# Patient Record
Sex: MALE | Race: BLACK OR AFRICAN AMERICAN | NOT HISPANIC OR LATINO | Employment: UNEMPLOYED | URBAN - METROPOLITAN AREA
[De-identification: names, ages, dates, MRNs, and addresses within clinical notes are randomized per-mention and may not be internally consistent; named-entity substitution may affect disease eponyms.]

---

## 2021-05-17 ENCOUNTER — HOSPITAL ENCOUNTER (EMERGENCY)
Facility: HOSPITAL | Age: 31
Discharge: HOME | End: 2021-05-17
Attending: EMERGENCY MEDICINE
Payer: COMMERCIAL

## 2021-05-17 VITALS
HEART RATE: 97 BPM | DIASTOLIC BLOOD PRESSURE: 68 MMHG | RESPIRATION RATE: 15 BRPM | OXYGEN SATURATION: 98 % | SYSTOLIC BLOOD PRESSURE: 145 MMHG | WEIGHT: 227.07 LBS | TEMPERATURE: 97.6 F | HEIGHT: 71 IN | BODY MASS INDEX: 31.79 KG/M2

## 2021-05-17 DIAGNOSIS — A64 STD (MALE): Primary | ICD-10-CM

## 2021-05-17 PROCEDURE — 87491 CHLMYD TRACH DNA AMP PROBE: CPT | Performed by: EMERGENCY MEDICINE

## 2021-05-17 PROCEDURE — 87591 N.GONORRHOEAE DNA AMP PROB: CPT | Performed by: EMERGENCY MEDICINE

## 2021-05-17 PROCEDURE — 36415 COLL VENOUS BLD VENIPUNCTURE: CPT | Performed by: EMERGENCY MEDICINE

## 2021-05-17 PROCEDURE — 99283 EMERGENCY DEPT VISIT LOW MDM: CPT

## 2021-05-17 PROCEDURE — 99284 EMERGENCY DEPT VISIT MOD MDM: CPT | Performed by: EMERGENCY MEDICINE

## 2021-05-17 PROCEDURE — 86592 SYPHILIS TEST NON-TREP QUAL: CPT | Performed by: EMERGENCY MEDICINE

## 2021-05-17 PROCEDURE — 96372 THER/PROPH/DIAG INJ SC/IM: CPT

## 2021-05-17 PROCEDURE — 87255 GENET VIRUS ISOLATE HSV: CPT | Performed by: EMERGENCY MEDICINE

## 2021-05-17 RX ORDER — DOXYCYCLINE HYCLATE 100 MG/1
100 CAPSULE ORAL 2 TIMES DAILY
Qty: 20 CAPSULE | Refills: 0 | Status: SHIPPED | OUTPATIENT
Start: 2021-05-17 | End: 2021-05-24

## 2021-05-17 RX ORDER — CEFTRIAXONE SODIUM 250 MG/1
250 INJECTION, POWDER, FOR SOLUTION INTRAMUSCULAR; INTRAVENOUS ONCE
Status: DISCONTINUED | OUTPATIENT
Start: 2021-05-17 | End: 2021-05-17

## 2021-05-17 RX ADMIN — WATER 500 MG: 1 INJECTION INTRAMUSCULAR; INTRAVENOUS; SUBCUTANEOUS at 12:56

## 2021-05-17 NOTE — ED PROVIDER NOTES
History  Chief Complaint   Patient presents with    Exposure to STD     positive for gnorrhea and chlamydia 1 month ago, did not take meds as directed, now developed a sore to genitals  30 yo male c/o urethral discharge, mild dysuria and two lesions on his penis  He says he was tested positive for STD about 1 month ago, affirms he was given injx at the time, but did not complete the abx, and also found out his sexual partner also has the same symptoms that recurred since then  He denies fever  History provided by:  Patient      None       Past Medical History:   Diagnosis Date    Diabetes mellitus ([facility] [address] )     Hypertension        No past surgical history on file  No family history on file  I have reviewed and agree with the history as documented  E-Cigarette/Vaping     E-Cigarette/Vaping Substances     Social History     Tobacco Use    Smoking status: Never Smoker    Smokeless tobacco: Never Used   Substance Use Topics    Alcohol use: Yes    Drug use: Not Currently       Review of Systems   Constitutional: Negative for appetite change, chills and fever  HENT: Negative for sore throat  Respiratory: Negative for cough, shortness of breath and wheezing  Cardiovascular: Negative for chest pain and palpitations  Gastrointestinal: Negative for abdominal pain, diarrhea, nausea and vomiting  Genitourinary: Positive for discharge, dysuria and genital sores  Negative for hematuria  Musculoskeletal: Negative for neck pain  Skin: Negative for rash  Neurological: Negative for dizziness, weakness and headaches  Psychiatric/Behavioral: Negative for suicidal ideas  All other systems reviewed and are negative  Physical Exam  Physical Exam  Vitals signs and nursing note reviewed  Constitutional:       Appearance: He is well-developed  HENT:      Head: Normocephalic and atraumatic        Right Ear: External ear normal       Left Ear: External ear normal       Nose: Nose normal  Eyes:      Conjunctiva/sclera: Conjunctivae normal       Pupils: Pupils are equal, round, and reactive to light  Neck:      Musculoskeletal: Normal range of motion and neck supple  Cardiovascular:      Rate and Rhythm: Normal rate  Pulmonary:      Effort: Pulmonary effort is normal    Abdominal:      Tenderness: There is no abdominal tenderness  Genitourinary:     Penis: Lesions (2 distict ulcerative lesions, he said they are painful) present  No swelling  Scrotum/Testes: Normal       Epididymis:      Right: No tenderness  Left: No tenderness  Musculoskeletal: Normal range of motion  Lymphadenopathy:      Lower Body: Left inguinal adenopathy present  Skin:     General: Skin is warm and dry  Neurological:      Mental Status: He is alert and oriented to person, place, and time  Cranial Nerves: No cranial nerve deficit  Coordination: Coordination normal    Psychiatric:         Behavior: Behavior normal          Thought Content: Thought content normal          Judgment: Judgment normal          Vital Signs  ED Triage Vitals [05/17/21 1230]   Temperature Pulse Respirations Blood Pressure SpO2   98 °F (36 7 °C) 97 15 145/68 98 %      Temp Source Heart Rate Source Patient Position - Orthostatic VS BP Location FiO2 (%)   Oral Monitor -- -- --      Pain Score       No Pain           Vitals:    05/17/21 1230   BP: 145/68   Pulse: 97         Visual Acuity      ED Medications  Medications   cefTRIAXone (ROCEPHIN) 500 mg in sterile water IM only syringe (500 mg Intramuscular Given 5/17/21 1256)       Diagnostic Studies  Results Reviewed     Procedure Component Value Units Date/Time    Chlamydia/GC amplified DNA by PCR [[id number]] Collected: 05/17/21 1301    Lab Status: In process Specimen: Urine, Other Updated: 05/17/21 1305    RPR [[id number]] Collected: 05/17/21 1257    Lab Status:  In process Specimen: Blood from Arm, Left Updated: 05/17/21 1302    Herpes simplex virus culture [[id number]] Collected: 05/17/21 1244    Lab Status: In process Specimen: Other from Penis Updated: 05/17/21 1253                 No orders to display              Procedures  Procedures         ED Course                             SBIRT 22yo+      Most Recent Value   SBIRT (23 yo +)   In order to provide better care to our patients, we are screening all of our patients for alcohol and drug use  Would it be okay to ask you these screening questions? No Filed at: 05/17/2021 1231                    MDM  Number of Diagnoses or Management Options  STD (male):   Diagnosis management comments: I am treating empirically for GC/Chlamydia, and sending additional testing for HSV from the lesion, and RPR serum test because he describes the ulcer lesions as painful, but neither is he aware when they appeared and can't recall if they were vesicular at onset  Disposition  Final diagnoses:   STD (male) - possible, GC/Chlamydia, HSV, Syphilis, chancroid     Time reflects when diagnosis was documented in both MDM as applicable and the Disposition within this note     Time User Action Codes Description Comment    5/17/2021 12:43 PM John Alcantara [A64] STD (male)     5/17/2021 12:44 PM Radha, [address] [place] STD (male) possible, GC/Chlamydia, HSV, Syphilis, chancroid      ED Disposition     ED Disposition Condition Date/Time Comment    Discharge Good Mon May 17, 2021 12:43 PM Renata Briseno discharge to home/self care              Follow-up Information     Follow up With Specialties Details Why [address], DO Internal Medicine Schedule an appointment as soon as possible for a visit  For followup [address]  [phone]            Discharge Medication List as of 5/17/2021 12:50 PM      START taking these medications    Details   doxycycline hyclate (VIBRAMYCIN) 100 mg capsule Take 1 capsule (100 mg total) by mouth 2 (two) times a day for 7 days, Starting Mon 5/17/2021, Until Mon 5/24/2021, Print           No discharge procedures on file      PDMP Review     None          ED Provider  Electronically Signed by           Chelsie Pitts MD  05/17/21 9779

## 2021-05-17 NOTE — DISCHARGE INSTRUCTIONS
You also have Herpes and syphilis tests pending in addition to the gonorrhea/chlamydia for which you were tested and treated  You will need ADDITIONAL antibiotics if the syphilis is also positive  I highly encouraged you to follow up with your [facility] for additional testing, information for HIV  Urethritis in Men   WHAT YOU NEED TO KNOW: You were treated in the ED for the sexually transmitted causes gonorrhea/chlamydia  Confirmative testing was sent in the ED and you will be notified if it is positive  Urethritis is a condition that causes inflammation of the urethra and is often caused by sexually transmitted infections  The urethra is the tube where urine passes from the bladder to the outside of the body  Men who have sex with men and those with multiple sexual partners are at a high risk of having this condition  Use condoms:  Wear a condom during oral, vaginal, and anal sex  Ask for more information about the correct way to use condoms  Do not have sex with someone who has urethritis: This includes oral, vaginal, and anal sex  Contact your healthcare provider if:   You have a fever  You have chills, a cough, or feel weak and achy  You continue to have signs or symptoms after being treated for nonspecific urethritis  You have questions or concerns about your condition or care  Return to the emergency department if:   You have joint stiffness, muscle pain, or eye inflammation  You have pain and swelling in your scrotum  You have severe abdominal pain  You have chest pain or trouble breathing  IF the Herpes is positive  Genital Herpes Simplex   WHAT YOU NEED TO KNOW:   Genital herpes is a sexually transmitted infection (STI) that is caused by the herpes simplex virus (HSV)  It is spread through oral, vaginal, or anal sex  It may be spread even if you do not see blisters   It can also be spread to other areas of your body, including your eyes, by touching open blisters  Unprotected sex or sex with multiple partners increases your risk for genital herpes  DISCHARGE INSTRUCTIONS:   Call 911 for any of the following: You have trouble breathing  You have a seizure  Your neck is stiff  You have trouble thinking clearly  You have chills or a fever  You have painful blisters on your penis, vagina, anus, or mouth  Fluid or blood is coming out of your genitals  You have trouble urinating  You think you are pregnant and you are bleeding from your vagina  You have trouble chewing or swallowing  Medicines:   Antivirals may help decrease your symptoms if you start them before your next outbreak  Numbing cream or ointment may help decrease pain  NSAIDs , such as ibuprofen, help decrease swelling, pain, and fever  Manage your symptoms: Do the following to be more comfortable when your infection is active:  Keep the blisters clean and dry  Wash them with soap and warm water, and pat dry gently  Wear cotton underwear and loose clothing  This may help to keep the blisters dry and keep clothes from rubbing  Apply ice on the area for 15 to 20 minutes every hour or as directed  Use an ice pack, or put crushed ice in a plastic bag  Cover it with a towel  Ice helps prevent tissue damage and decreases swelling and pain  Apply heat on the area for 20 to 30 minutes every 2 hours for as many days as directed  A warm bath may also help  Heat helps decrease pain and muscle spasms  Prevent the spread of genital herpes:   Use condoms  Use a latex condom when you have oral, genital, and anal sex  Use a new condom each time  Use a polyurethane condom if you are allergic to latex  Try not to touch your blisters  Wash your hands before and after you touch the area  Do not kiss anyone if you have blisters around your mouth   Do not breastfeed if you have blisters on your breast       Tell your partners that you have genital herpes  Do not have sex until he or she knows that you have genital herpes  Ask your healthcare provider for ways to tell partners about your infection  Tell your healthcare providers that you have genital herpes  If you are pregnant, your baby may need special monitoring  Inform your healthcare provider of your condition to avoid spreading the infection to your baby

## 2021-05-18 LAB — RPR SER QL: NORMAL

## 2021-05-19 LAB
C TRACH DNA SPEC QL NAA+PROBE: NEGATIVE
N GONORRHOEA DNA SPEC QL NAA+PROBE: NEGATIVE

## 2021-05-20 LAB — HSV SPEC CULT: NORMAL
